# Patient Record
Sex: FEMALE | NOT HISPANIC OR LATINO | URBAN - METROPOLITAN AREA
[De-identification: names, ages, dates, MRNs, and addresses within clinical notes are randomized per-mention and may not be internally consistent; named-entity substitution may affect disease eponyms.]

---

## 2017-11-13 ENCOUNTER — IMPORTED ENCOUNTER (OUTPATIENT)
Dept: URBAN - METROPOLITAN AREA CLINIC 1 | Facility: CLINIC | Age: 42
End: 2017-11-13

## 2017-11-13 PROBLEM — Z01.00: Noted: 2017-11-13

## 2017-11-13 PROCEDURE — S0621 ROUTINE OPHTHALMOLOGICAL EXA: HCPCS

## 2017-11-13 NOTE — PATIENT DISCUSSION
1.  Routine Exam- Patient has minimal refractive error. All conditions discussed with patient and parent today. 2.  CRICKET OU-REC patient to use AT BID OU 3. Return for an appointment in 1 year for 40. with Dr. Morteza Ballesteros.

## 2018-12-27 ENCOUNTER — IMPORTED ENCOUNTER (OUTPATIENT)
Dept: URBAN - METROPOLITAN AREA CLINIC 1 | Facility: CLINIC | Age: 43
End: 2018-12-27

## 2018-12-27 PROBLEM — H52.4: Noted: 2018-12-27

## 2018-12-27 PROCEDURE — S0621 ROUTINE OPHTHALMOLOGICAL EXA: HCPCS

## 2018-12-27 NOTE — PATIENT DISCUSSION
1.  Presbyopia: Rx was given for corrective spectacles if indicated. 2.  Dry Eyes OUReturn for an appointment in 1 year 36 with Dr. Zena Angelo.

## 2021-07-12 ENCOUNTER — IMPORTED ENCOUNTER (OUTPATIENT)
Dept: URBAN - METROPOLITAN AREA CLINIC 1 | Facility: CLINIC | Age: 46
End: 2021-07-12

## 2021-07-12 PROBLEM — H52.4: Noted: 2021-07-12

## 2021-07-12 PROCEDURE — S0621 ROUTINE OPHTHALMOLOGICAL EXA: HCPCS

## 2021-07-12 NOTE — PATIENT DISCUSSION
1.  Presbyopia -- Rx was given for corrective spectacles if indicated. 2.  Dry Eyes OU -- Cont ATs BID OU routinely. Return for an appointment in 1 year 36 with Dr. Janice Richard.

## 2021-07-12 NOTE — PATIENT DISCUSSION
Dry Eyes OU -- Cont ATs BID OU routinely. Return for an appointment in 1 year 36 with Dr. Damon Baptiste.

## 2022-04-03 ASSESSMENT — VISUAL ACUITY
OD_SC: J1
OS_CC: 20/20
OS_CC: 20/20
OD_SC: J1
OD_CC: 20/20
OS_SC: J1
OS_CC: 20/20
OS_SC: J2
OS_SC: J1
OD_SC: J2

## 2022-04-03 ASSESSMENT — TONOMETRY
OD_IOP_MMHG: 13
OD_IOP_MMHG: 15
OS_IOP_MMHG: 14
OD_IOP_MMHG: 14
OS_IOP_MMHG: 13
OS_IOP_MMHG: 14

## 2023-09-06 ENCOUNTER — COMPREHENSIVE EXAM (OUTPATIENT)
Dept: URBAN - METROPOLITAN AREA CLINIC 1 | Facility: CLINIC | Age: 48
End: 2023-09-06

## 2023-09-06 DIAGNOSIS — H52.4: ICD-10-CM

## 2023-09-06 DIAGNOSIS — Z01.00: ICD-10-CM

## 2023-09-06 PROCEDURE — 92014 COMPRE OPH EXAM EST PT 1/>: CPT

## 2023-09-06 PROCEDURE — 92015 DETERMINE REFRACTIVE STATE: CPT

## 2023-09-06 ASSESSMENT — VISUAL ACUITY
OS_CC: J1
OD_CC: J1+
OD_SC: 20/20-1
OS_SC: 20/20

## 2023-09-06 ASSESSMENT — TONOMETRY
OD_IOP_MMHG: 13
OS_IOP_MMHG: 13

## 2024-07-01 ENCOUNTER — COMPREHENSIVE EXAM (OUTPATIENT)
Dept: URBAN - METROPOLITAN AREA CLINIC 2 | Facility: CLINIC | Age: 49
End: 2024-07-01

## 2024-07-01 DIAGNOSIS — H52.4: ICD-10-CM

## 2024-07-01 PROCEDURE — 92015 DETERMINE REFRACTIVE STATE: CPT

## 2024-07-01 PROCEDURE — 92014 COMPRE OPH EXAM EST PT 1/>: CPT

## 2024-07-01 ASSESSMENT — VISUAL ACUITY
OS_SC: 20/20
OD_SC: 20/20-1
OU_SC: J5

## 2024-07-01 ASSESSMENT — TONOMETRY
OS_IOP_MMHG: 15
OD_IOP_MMHG: 15